# Patient Record
Sex: FEMALE | URBAN - METROPOLITAN AREA
[De-identification: names, ages, dates, MRNs, and addresses within clinical notes are randomized per-mention and may not be internally consistent; named-entity substitution may affect disease eponyms.]

---

## 2023-03-15 PROBLEM — Z00.00 ENCOUNTER FOR PREVENTIVE HEALTH EXAMINATION: Status: ACTIVE | Noted: 2023-03-15

## 2023-03-20 ENCOUNTER — OUTPATIENT (OUTPATIENT)
Dept: OUTPATIENT SERVICES | Facility: HOSPITAL | Age: 63
LOS: 1 days | Discharge: HOME | End: 2023-03-20
Payer: COMMERCIAL

## 2023-03-23 ENCOUNTER — APPOINTMENT (OUTPATIENT)
Dept: RADIATION ONCOLOGY | Facility: CLINIC | Age: 63
End: 2023-03-23
Payer: COMMERCIAL

## 2023-03-23 VITALS
HEART RATE: 78 BPM | TEMPERATURE: 97.34 F | DIASTOLIC BLOOD PRESSURE: 81 MMHG | OXYGEN SATURATION: 100 % | BODY MASS INDEX: 22.28 KG/M2 | SYSTOLIC BLOOD PRESSURE: 129 MMHG | HEIGHT: 61 IN | RESPIRATION RATE: 17 BRPM | WEIGHT: 118 LBS

## 2023-03-23 DIAGNOSIS — E78.5 HYPERLIPIDEMIA, UNSPECIFIED: ICD-10-CM

## 2023-03-23 DIAGNOSIS — E11.9 TYPE 2 DIABETES MELLITUS W/OUT COMPLICATIONS: ICD-10-CM

## 2023-03-23 DIAGNOSIS — C77.3 MALIGNANT NEOPLASM OF UNSPECIFIED SITE OF RIGHT FEMALE BREAST: ICD-10-CM

## 2023-03-23 DIAGNOSIS — Z87.19 PERSONAL HISTORY OF OTHER DISEASES OF THE DIGESTIVE SYSTEM: ICD-10-CM

## 2023-03-23 DIAGNOSIS — C50.911 MALIGNANT NEOPLASM OF UNSPECIFIED SITE OF RIGHT FEMALE BREAST: ICD-10-CM

## 2023-03-23 DIAGNOSIS — I10 ESSENTIAL (PRIMARY) HYPERTENSION: ICD-10-CM

## 2023-03-23 DIAGNOSIS — Z86.39 PERSONAL HISTORY OF OTHER ENDOCRINE, NUTRITIONAL AND METABOLIC DISEASE: ICD-10-CM

## 2023-03-23 PROCEDURE — 99204 OFFICE O/P NEW MOD 45 MIN: CPT | Mod: GC,25

## 2023-03-26 PROBLEM — C50.911 CARCINOMA OF RIGHT BREAST METASTATIC TO AXILLARY LYMPH NODE: Status: ACTIVE | Noted: 2023-03-23

## 2023-03-26 NOTE — PHYSICAL EXAM
[Sclera] : the sclera and conjunctiva were normal [PERRL With Normal Accommodation] : pupils were equal in size, round, reactive to light [] : no respiratory distress [Exaggerated Use Of Accessory Muscles For Inspiration] : no accessory muscle use [Breast Palpation Mass] : no palpable masses [Breast Abnormal Lactation (Galactorrhea)] : no nipple discharge [Heart Rate And Rhythm] : heart rate and rhythm were normal [Abdomen Soft] : soft [Nondistended] : nondistended [Supraclavicular Lymph Nodes Enlarged Bilaterally] : supraclavicular [Normal] : no focal deficits [Axillary Lymph Nodes Enlarged Bilaterally] : axillary [de-identified] : Left lumpectomy and axillary scars are clean and dry, mild edema, tender, no erythema, full ROM of right arm.

## 2023-03-26 NOTE — HISTORY OF PRESENT ILLNESS
[FreeTextEntry1] : Ms. Rocha is a 61 yo female with multifocal IDC of the right breast who is s/p neoadjuvant chemotherapy, followed by lumpectomy with re-excision for positive margins. She was referred by Dr. Houston and Dr. Hurt for radiotherapy recommendations. \par \par She was undergoing mammography every other year until about 2016. Her next screening mammogram was on 8/19/22. \par \par Diagnostic right mammography on 8/31/22 showed architectural distortion with associated calcifications spanning 3 cm in the right upper outer breast. Targeted right breast ultrasound showed an irregular spiculated hypoechoic mass in the right upper outer breast at 10:30, 8 cm FN, correlating with the mammographic finding, measuring up to 14 mm. A right axillary lymph node measuring up to 20 mm with obliteration of the hilum was also noted. \par \par US guided breast biopsy on 8/31/22 of the upper outer right breast mass showed invasive poorly differentiated ductal carcinoma spanning at least 6 mm. DCIS was also present, intermediate nuclear grade, solid and papillary patterns. Biopsy of the right axillary lymph node showed metastatic carcinoma similar to the right breast pathology. IHC showed ER >90%, AK 60%, and  HER2 1+. \par \par Breast MRI on 9/13/22 showed a spiculated enhancing mass measuring up to 20 mm in the right upper outer breast with biopsy marker. Non mass enhancement extended posteriorly for 3.3 cm from the index lesion. Multiple enlarged right axillary lymph node were seen, the largest measuring up to 2.5 cm with a biopsy clip. \par \par PET CT on  9/16/22 showed hypermetabolic right breast nodule and right axillary lymphadenopathy, and no evidence of distant metastatic disease. \par \par She underwent neoadjuvant chemotherapy with dose dense AC followed by Taxol, completing treatment on 1/12/23.\par \par Diagnostic right mammography on 1/19/23 showed a spiculated mass decreased in size with associated calcifications extending 2 cm posterior to the mass. Targeting right breast ultrasound showed the previously identified mass decreased in size to 1.4 x 0.7 x 0.6 cm. The right axillary lymph node decreased in size as well to 1 x 0.6 x 0.9 cm.\par \par She underwent lumpectomy with sentinel lymph node biopsy on 2/14/23. Pathology showed multiple foci of invasive moderately differentiated ductal carcinoma seen as single cells and nests of cells spread across a tumor bed, the largest measuring 14 mm, [place] score 7/9. Multifocal lymphatic invasion was present. DCIS, with high nuclear grade, cribriform pattern, lobular extension spanning 40 mm, and was present in 10 out of 32 blocks. Shaved margins were obtained and DCIS was present in the anterior, medial and lateral margins, extending to within 2 mm in all three specimens. Additional invasive disease was present in the anterior shaved margin measuring 12 mm, and microinvasive disease was present in the medial shaved margin.  The superior and inferior shaved margins had benign breast tissue. \par \par Right axillary sentinel lymph node biopsy yielded 19 lymph nodes, three of which contained metastatic carcinoma. The largest metastatic focus measured 5 mm and there was no extranodal extension. Treatment related changes and biopsy site changes were seen. Additional right axillary excision showed 4 negative lymph nodes and no evidence of treatment related changes.  \par \par She underwent  re-excision of the margins on 3/15/23 with pathology pending.\par \par She continues to have pain right breast, upper portion. She was taking Tylenol as needed.  She denies bleeding from the surgical site, drainage, or fevers.  She reports she had genetic testing that was reportedly negative.\par

## 2023-03-26 NOTE — LETTER CLOSING
[Consult Closing:] : Thank you for allowing me to participate in the care of this patient.  If you have any questions, please do not hesitate to contact me. [Sincerely yours,] : Sincerely yours, [FreeTextEntry3] : Ashlie Snell MD\par

## 2023-03-26 NOTE — VITALS
[Maximal Pain Intensity: 4/10] : 4/10 [Least Pain Intensity: 3/10] : 3/10 [Pain Description/Quality: ___] : Pain description/quality: [unfilled] [Pain Duration: ___] : Pain duration: [unfilled] [Pain Location: ___] : Pain Location: [unfilled] [OTC] : OTC [80: Normal activity with effort; some signs or symptoms of disease.] : 80: Normal activity with effort; some signs or symptoms of disease.  [ECOG Performance Status: 1 - Restricted in physically strenuous activity but ambulatory and able to carry out work of a light or sedentary nature] : Performance Status: 1 - Restricted in physically strenuous activity but ambulatory and able to carry out work of a light or sedentary nature, e.g., light house work, office work [2 - Distress Level] : Distress Level: 2 [Pain Interferes with ADLs] : Pain does not interfere with activities of daily living

## 2023-04-06 ENCOUNTER — NON-APPOINTMENT (OUTPATIENT)
Age: 63
End: 2023-04-06

## 2023-04-06 PROCEDURE — 77290 THER RAD SIMULAJ FIELD CPLX: CPT | Mod: 26

## 2023-04-06 PROCEDURE — 77333 RADIATION TREATMENT AID(S): CPT | Mod: 26

## 2023-04-07 ENCOUNTER — RESULT REVIEW (OUTPATIENT)
Age: 63
End: 2023-04-07

## 2023-04-14 PROCEDURE — 77295 3-D RADIOTHERAPY PLAN: CPT | Mod: 26

## 2023-04-14 PROCEDURE — 77300 RADIATION THERAPY DOSE PLAN: CPT | Mod: 26

## 2023-04-14 PROCEDURE — 77334 RADIATION TREATMENT AID(S): CPT | Mod: 26

## 2023-04-21 PROCEDURE — 77280 THER RAD SIMULAJ FIELD SMPL: CPT | Mod: 26

## 2023-04-26 ENCOUNTER — NON-APPOINTMENT (OUTPATIENT)
Age: 63
End: 2023-04-26

## 2023-04-28 PROCEDURE — 77427 RADIATION TX MANAGEMENT X5: CPT

## 2023-04-30 NOTE — PHYSICAL EXAM
[Normal] : well developed, well nourished, in no acute distress [de-identified] : Left lumpectomy and axillary scars are clean and dry, no erythema

## 2023-04-30 NOTE — REVIEW OF SYSTEMS
neuro psyc eval in [place] on monday  They have not received the referral yet, mom advised if not received by Friday the appointment will be cancelled.  NEUROBEHAVIORAL  EXAM        Dimitri Alcala, PHD  [facility] [address] [facility] [address] [phone]     [Dermatitis Radiation: Grade 0] : Dermatitis Radiation: Grade 0 [Alopecia: Grade 0] : Alopecia: Grade 0 [Skin Atrophy: Grade 0] : Skin Atrophy: Grade 0 [Pruritus: Grade 0] : Pruritus: Grade 0 [Skin Hyperpigmentation: Grade 0] : Skin Hyperpigmentation: Grade 0 [Skin Induration: Grade 0] : Skin Induration: Grade 0

## 2023-04-30 NOTE — HISTORY OF PRESENT ILLNESS
[FreeTextEntry1] : Ms. Rocha is a 63yo woman with clinical stage T1N1M0 poorly differentiated ductal carcinoma of the right breast, ER >90%, [place] 60%, and HER2 1+. She received neoadjuvant chemotherapy with ddACT followed by lumpectomy and ALND showing [id number] residual disease. She is currently receiving adjuvant radiation therapy. \par \par Patient reports feeling well overall. Mild hyperpigmentation presenting around areola and lumpectomy site. Patient denies pain/discomfort at this time. On 4/6/2023 patient saw breast surgeon Dr. Kateryna Houston for follow-up, patient had some swelling and tenderness and physician drained some fluid from right outer breast 10pm position,  discomfort has since subsided and resolved.  Patient was applying cream prior to RT as well as after. Reviewed skin care protocol with patient. Patient verbalized understanding and to contact with any questions or concerns. \par

## 2023-04-30 NOTE — DISEASE MANAGEMENT
[Pathological] : TNM Stage: p [II] : II [TTNM] : T1b [NTNM] : N1a [MTNM] : 0 [de-identified] : 1592 cgy [de-identified] : right breast and regional lymph nodes [de-identified] : 2412

## 2023-05-03 ENCOUNTER — NON-APPOINTMENT (OUTPATIENT)
Age: 63
End: 2023-05-03

## 2023-05-03 PROCEDURE — 77387C: CUSTOM

## 2023-05-04 PROCEDURE — 77387C: CUSTOM

## 2023-05-05 PROCEDURE — 77427 RADIATION TX MANAGEMENT X5: CPT

## 2023-05-05 PROCEDURE — 77387C: CUSTOM

## 2023-05-08 PROCEDURE — 77387C: CUSTOM

## 2023-05-09 PROCEDURE — 77280 THER RAD SIMULAJ FIELD SMPL: CPT | Mod: 26

## 2023-05-09 PROCEDURE — 77387C: CUSTOM

## 2023-05-10 ENCOUNTER — NON-APPOINTMENT (OUTPATIENT)
Age: 63
End: 2023-05-10

## 2023-05-10 PROCEDURE — 77387C: CUSTOM

## 2023-05-11 PROCEDURE — 77387C: CUSTOM

## 2023-05-12 PROCEDURE — 77387C: CUSTOM

## 2023-05-12 PROCEDURE — 77427 RADIATION TX MANAGEMENT X5: CPT

## 2023-05-12 NOTE — HISTORY OF PRESENT ILLNESS
[FreeTextEntry1] : Ms. Rocha is a 61yo woman with clinical stage T1N1M0 poorly differentiated ductal carcinoma of the right breast, ER >90%, [place] 60%, and HER2 1+. She received neoadjuvant chemotherapy with ddACT followed by lumpectomy and ALND showing [id number] residual disease. She is currently receiving adjuvant radiation therapy. \par \par She is feeling generally well. She denies fatigue and pain. She has noted mild darkening. She is using Aquaphor on the skin. Aquaphor and coconut oil.

## 2023-05-12 NOTE — DISEASE MANAGEMENT
[Pathological] : TNM Stage: p [II] : II [TTNM] : T1b [NTNM] : N1a [MTNM] : 0 [de-identified] : 3695 cgy [de-identified] : 3258 [de-identified] : right breast and regional lymph nodes

## 2023-05-12 NOTE — PHYSICAL EXAM
[Normal] : well developed, well nourished, in no acute distress [de-identified] : Left lumpectomy and axillary scars are clean and dry, mild hyperpigmentation and erythema

## 2023-05-13 NOTE — DISEASE MANAGEMENT
[Pathological] : TNM Stage: p [II] : II [TTNM] : T1b [NTNM] : N1a [MTNM] : 0 [de-identified] : 2120 cgy [de-identified] : 1980 [de-identified] : right breast and regional lymph nodes

## 2023-05-13 NOTE — PHYSICAL EXAM
[Normal] : well developed, well nourished, in no acute distress [de-identified] : Left lumpectomy and axillary scars are clean and dry, mild hyperpigmentation and erythema

## 2023-05-13 NOTE — HISTORY OF PRESENT ILLNESS
[FreeTextEntry1] : Ms. Rocha is a 63yo woman with clinical stage T1N1M0 poorly differentiated ductal carcinoma of the right breast, ER >90%, [place] 60%, and HER2 1+. She received neoadjuvant chemotherapy with ddACT followed by lumpectomy and ALND showing [id number] residual disease. She is currently receiving adjuvant radiation therapy. \par \par She is feeling generally well. She denies fatigue and pain. She has noted mild darkening of the skin. She is using Aquaphor on the skin.

## 2023-05-15 PROCEDURE — 77387C: CUSTOM

## 2023-05-16 PROCEDURE — 77387C: CUSTOM

## 2023-05-17 ENCOUNTER — NON-APPOINTMENT (OUTPATIENT)
Age: 63
End: 2023-05-17

## 2023-05-17 PROCEDURE — 77387C: CUSTOM

## 2023-05-17 NOTE — DISEASE MANAGEMENT
[Pathological] : TNM Stage: p [II] : II [TTNM] : T1b [NTNM] : N1a [MTNM] : 0 [de-identified] : 3000 cGy [de-identified] : 1823 [de-identified] : right breast and regional lymph nodes

## 2023-05-17 NOTE — HISTORY OF PRESENT ILLNESS
[FreeTextEntry1] : Ms. Rocha is a 61yo woman with clinical stage T1N1M0 poorly differentiated ductal carcinoma of the right breast, ER >90%, [place] 60%, and HER2 1+. She received neoadjuvant chemotherapy with ddACT followed by lumpectomy and ALND showing [id number] residual disease. She is currently receiving adjuvant radiation therapy. \par \par She is feeling generally well. She denies fatigue and pain. She has noted darkening of the skin. She is using Aquaphor and coconut oil.

## 2023-05-17 NOTE — PHYSICAL EXAM
[Normal] : well developed, well nourished, in no acute distress [de-identified] : Left lumpectomy and axillary scars are clean and dry, moderate hyperpigmentation and erythema

## 2023-05-18 PROCEDURE — 77387C: CUSTOM

## 2023-05-19 PROCEDURE — 77427 RADIATION TX MANAGEMENT X5: CPT

## 2023-05-19 PROCEDURE — 77387C: CUSTOM

## 2023-06-26 ENCOUNTER — APPOINTMENT (OUTPATIENT)
Dept: RADIATION ONCOLOGY | Facility: CLINIC | Age: 63
End: 2023-06-26

## 2024-10-11 ENCOUNTER — APPOINTMENT (OUTPATIENT)
Dept: OTOLARYNGOLOGY | Facility: CLINIC | Age: 64
End: 2024-10-11
Payer: COMMERCIAL

## 2024-10-11 VITALS
WEIGHT: 134 LBS | HEIGHT: 61 IN | HEART RATE: 72 BPM | SYSTOLIC BLOOD PRESSURE: 121 MMHG | OXYGEN SATURATION: 100 % | BODY MASS INDEX: 25.3 KG/M2 | DIASTOLIC BLOOD PRESSURE: 78 MMHG

## 2024-10-11 DIAGNOSIS — M26.609 UNSPECIFIED TEMPOROMANDIBULAR JOINT DISORDER: ICD-10-CM

## 2024-10-11 DIAGNOSIS — H90.3 SENSORINEURAL HEARING LOSS, BILATERAL: ICD-10-CM

## 2024-10-11 DIAGNOSIS — H93.293 OTHER ABNORMAL AUDITORY PERCEPTIONS, BILATERAL: ICD-10-CM

## 2024-10-11 PROCEDURE — 92557 COMPREHENSIVE HEARING TEST: CPT

## 2024-10-11 PROCEDURE — 92567 TYMPANOMETRY: CPT

## 2024-10-11 PROCEDURE — 99204 OFFICE O/P NEW MOD 45 MIN: CPT

## 2024-10-11 RX ORDER — LOSARTAN POTASSIUM 25 MG/1
25 TABLET, FILM COATED ORAL
Refills: 0 | Status: ACTIVE | COMMUNITY

## 2024-10-11 RX ORDER — ANASTROZOLE 1 MG/1
1 TABLET ORAL
Refills: 0 | Status: ACTIVE | COMMUNITY

## 2024-10-11 RX ORDER — ABEMACICLIB 150 MG/1
150 TABLET ORAL
Refills: 0 | Status: ACTIVE | COMMUNITY

## 2024-11-27 ENCOUNTER — APPOINTMENT (OUTPATIENT)
Dept: MRI IMAGING | Facility: IMAGING CENTER | Age: 64
End: 2024-11-27